# Patient Record
(demographics unavailable — no encounter records)

---

## 2024-11-29 NOTE — HISTORY OF PRESENT ILLNESS
[FreeTextEntry1] : 2024     in person  PCP: Ольга Read ([facility]) f: [phone]             Card: Dr. Garcia ([facility]) On Lipitor, beta blocker -> new cardiologist -              Gyn: Delmis              ENT: Dr. Rincon            .            CC: Hashimoto's thyroiditis/hypothyroid -  - Dr. Rahman             R lobectomy -  - Dr. Rincon - [facility]             -ThySono  - little residual tissue             Hyperkalemia (?related to propranolol)              Osteopenia: 2016 spine T -2.0 ([facility])             2018 spine T -2.4 hip +0.2 neck -0.7 rad -2.4              Tinnitus, R ear hearing loss             Hx cervical neck pain            . SEVERE shellfish allergy             Metabolic profile: , HDL 42, Trig 300s              preDM A1c 5.8 % on 17    76 yo recently seen at [facility] ED for episode of atrial fibrillation.  Did not respond to medical Rx and required cardioversion. I had already lowered the LT4 dose and she is reluctant to have it lowered further b/o energy and weight issues She was not able to orchestrate the labs prior to today's visit.          . Her , Oscar, has MS and recently also diagnosed with hematologic problem.:   CLL  Dr. Binh Giordano He is [place] [facility]. Her sister has Graves disease after vaccine for Covid.  Her mother recently passed age 103 after massive CVA.  3/2023 TSH 0.432  on Synthroid JOSE 100 mcg daily   Now takes LT4 88 mcg daily.  : : Constitutional:  Alert, well nourished, healthy appearance, no acute distress  Eyes:  No proptosis, no stare Thyroid:  normal to palpation   Pulmonary:  No respiratory distress, no accessory muscle use; normal rate and effort Cardiac:  Normal rate Vascular:  Endocrine:  No stigmata of Cushings Syndrome Musculoskeletal:  Normal gait, no involuntary movements Neurology:  No tremors Affect/Mood/Psych:  Oriented x 3; normal affect, normal insight/judgement, normal mood  .  Impression:  Time for updated TFTs, A1c  Call me one week to see if further adjustment of levothyroxine dose would be beneficial. Can add 5 mg doses of propranolol to the metoprolol if indicated.         2024     in person         developed attrial fibrillation     swithced from propranolol to metoprool and Elaquis                                                     has colonoscopy - coloized with MRSA and she gets infections    PCP: Ольга Read ([facility]) f: [phone]             Card: Dr. Garcia ([facility]) On Lipitor, beta blocker             Gyn: Delmis              ENT: Dr. Rincon            .            CC: Hashimoto's thyroiditis/hypothyroid -  - Dr. Lacey FLORES lobectomy -  - Dr. Rincon - [facility]             -ThySono 2016 - little residual tissue             Hyperkalemia (?related to propranolol)              Osteopenia: 2016 spine T -2.0 ([facility])             2018 spine T -2.4 hip +0.2 neck -0.7 rad -2.4              Tinnitus, R ear hearing loss             Hx cervical neck pain            . SEVERE shellfish allergy             Metabolic profile: , HDL 42, Trig 300s              preDM A1c 5.8 % on 17            . Her , Oscar, has MS and recently also diagnosed with hematologic problem.:   CLL  Dr. Binh Giordano He is [place] [facility]. Her sister has Graves disease after vaccine for Covid.  Her mother recently passed age 103 after massive CVA.  3/2023 TSH 0.432  on Synthroid JOSE 100 mcg daily    : : Constitutional:  Alert, well nourished, healthy appearance, no acute distress  Eyes:  No proptosis, no stare Thyroid: Pulmonary:  No respiratory distress, no accessory muscle use; normal rate and effort Cardiac:  Normal rate Vascular:  Endocrine:  No stigmata of Cushings Syndrome Musculoskeletal:  Normal gait, no involuntary movements Neurology:  No tremors Affect/Mood/Psych:  Oriented x 3; normal affect, normal insight/judgement, normal mood  . Impression:  In view of episode of atrial fibrillation, may benefit from slightly lower dose of levothyroxine. Plan.  Lower dose of levothyroxine to 88 mcg daily.   Will need to address osteoporosis after next bone density and when she has fewer other issues to address.      2023       in person     PCP: Ольга Read ([facility]) f: [phone]             Card: Dr. Garcia ([facility]) On Lipitor, beta blocker             Gyn: Delmis              ENT: Dr. Rincon            .            CC: Hashimoto's thyroiditis/hypothyroid -  - Dr. Lacey FLORES lobectomy -  - Dr. Rincon - [facility]             -2016 - little residual tissue             Hyperkalemia (?related to propranolol)              Osteopenia: 2016 spine T -2.0 ([facility])             2018 spine T -2.4 hip +0.2 neck -0.7 rad -2.4              Tinnitus, R ear hearing loss             Hx cervical neck pain            . SEVERE shellfish allergy             Metabolic profile: , HDL 42, Trig 300s              preDM A1c 5.8 % on 17            . Her , Oscar, has MS and recently also diagnosed with hematologic problem.:   CLL  Dr. Binh Giordano He is followed at [facility]. Her sister has Graves disease after vaccine for Covid.  Her mother recently passed age 103 after massive CVA.   very ill.  : : Constitutional:  Alert, well nourished, healthy appearance, no acute distress  Eyes:  No proptosis, no stare Thyroid: Pulmonary:  No respiratory distress, no accessory muscle use; normal rate and effort Cardiac:  Normal rate Vascular:  Endocrine:  No stigmata of Cushing's Syndrome Musculoskeletal:  Normal gait, no involuntary movements Neurology:  No tremors Affect/Mood/Psych:  Oriented x 3; normal affect, normal insight/judgement, normal mood  .  Impression:  While her medical issues are stable, she is under a lot of stress.  Plan:   She prefers labs at [facility] in 6-8n [facility]   Aug 26, 2022      in person         her  has been illl and her 's mother . after CVA     PCP: Ольга Read ([facility]) f: [phone]             Card: Dr. Garcia ( [facility]) On Lipitor, beta blocker             Gyn: Delmis              ENT: Dr. Rincon            .            CC: Hashimoto's thyroiditis/hypothyroid -  - Dr. Lacey FLORES lobectomy -  - Dr. Rincon - [facility]             -2016 - little residual tissue             Hyperkalemia (?related to propranolol)              Osteopenia: 2016 spine T -2.0 ([facility])             2018 spine T -2.4 hip +0.2 neck -0.7 rad -2.4              Tinnitus, R ear hearing loss             Hx cervical neck pain            . SEVERE shellfish allergy             Metabolic profile: , HDL 42, Trig 300s              preDM A1c 5.8 % on 17            . Her , Oscar, has MS and recently also diagnosed with hematologic problem.:   CLL  Dr. Binh Giordano He is followed at [facility] [place]. Her sister has Graves disease after vaccine for Covid.   : : Constitutional:  Alert, well nourished, healthy appearance, no acute distress  Eyes:  No proptosis, no stare Thyroid: Pulmonary:  No respiratory distress, no accessory muscle use; normal rate and effort Cardiac:  Normal rate Vascular:  Endocrine:  No stigmata of Cushing's Syndrome Musculoskeletal:  Normal gait, no involuntary movements Neurology:  No tremors Affect/Mood/Psych:  Oriented x 3; normal affect, normal insight/judgement, normal mood  .  Imp:  Osteoporosis of spine.  Tomorrow will have labs related to this.               Invited to >< ROV in six months.        Dec 21, 2021      in person    PCP: Ольга Read ([facility]) f: [phone]             Card: Dr. Garcia ([facility]) On Lipitor, beta blocker             Gyn: Delmis              ENT: Dr. Rincon            .            CC: Hashimoto's thyroiditis/hypothyroid -  - Dr. Rahman             R lobectomy -  - Dr. Rincon - [facility]             -ThySono  - little residual tissue             Hyperkalemia (?related to propranolol)              Osteopenia: 2016 spine T -2.0 ([facility])             2018 spine T -2.4 hip +0.2 neck -0.7 rad -2.4              22 spine T -2.8  ***             Tinnitus, R ear hearing loss             Hx cervical neck pain            . SEVERE shellfish allergy             Metabolic profile: , HDL 42, Trig 300s              preDM A1c 5.8 % on 17            . Her , Oscar, has MS and recently also diagnosed with hematologic problem.:   CLL  Dr. Binh Giordano He is followed at [facility] [place]. Her sister has Graves disease after vaccine for Covid. Patient's mother recent stroke.  Taking Synthorid 100 mcg daily.  Impression:  Hypothryoidism well controlled. Prediabetes well controlled. Lots of stress Almost time for followup bone density - this time at [place] [facility] within 6 months.  Plan:  6 1/2 tabs a week.         Oct 28, 2020       iPhone   [phone]  PCP: Ольга Read ([facility]) f: [phone]             Card: Dr. Garcia ([facility]) On Lipitor, beta blocker             Gyn: Delmis              ENT: Dr. Rincon            .            CC: Hashimoto's thyroiditis/hypothyroid -  - Dr. Rahman             R lobectomy -  - Dr. Rincon - [facility]             -ThySono  - little residual tissue             Hyperkalemia (?related to propranolol)              Osteopenia: 2016 spine T -2.0 ([facility])             2018 spine T -2.4 hip +0.2 neck -0.7 rad -2.4              Tinnitus, R ear hearing loss             Hx cervical neck pain            . SEVERE shellfish allergy             Metabolic profile: , HDL 42, Trig 300s              preDM A1c 5.8 % on 17            . Her  has MS and recently also diagnosed with hematologic problem. Her CBC is WNL and she will be seeing Dr. Yoon for breast exam.      1.   Prediabetes - strong FHx     will need meter for testing   A1c 5.7 and  2.   Osteopenia   needs x-ray and bone density at [facility].    3.  Hashimotos    100 daily  - ih good range.       ROV  3-4 months.    2020  This is a 21+ minute Tele-Phonic encounter with an established patient which was initiated by the patient during a time scheduled for a visit and the patient is aware that this may be a billable encounter.  The patient has not seen a provider of my specialty (Endocrinology) within out group in the past 7 days and this encounter is not anticipated to result in a scheduled in-person visit within he next 7 days. The reason for this Tele-Phonic encounter is listed below under "CC:" Verbal consent was discussed and obtained from the patient for this visit:  "You have chosen to receive care through the use of tele-media or telephone.   This enables health care providers at different locations to provide safe, effective, and convenient care through the use of technology.  Please note this is a billable encounter.  As with any health care service, there are risks associated with the use of tele-media or telephone, including equipment failure, poor image and/or resolution, and  issues.  You understand that I cannot physically examine you and that you may need to come to the office to complete the assessment.  Patient agreed verbally to understanding the risks, benefits, alternatives of Tele-Media and telephone as explained.  All questions regarding tele-media and telephone encounters were answered.  This is a 21+ minute Tele-Phonic encounter with an established patient which was initiated by the patient during a time scheduled for a visit and the patient is aware that this may be a billable encounter.  The patient has not seen a provider of my specialty (Endocrinology) within out group in the past 7 days and this encounter is not anticipated to result in a scheduled in-person visit within he next 7 days. The reason for this Tele-Phonic encounter is listed below under "CC:" Verbal consent was discussed and obtained from the patient for this visit:  "You have chosen to receive care through the use of tele-media or telephone.   This enables health care providers at different locations to provide safe, effective, and convenient care through the use of technology.  Please note this is a billable encounter.  As with any health care service, there are risks associated with the use of tele-media or telephone, including equipment failure, poor image and/or resolution, and  issues.  You understand that I cannot physically examine you and that you may need to come to the office to complete the assessment.  Patient agreed verbally to understanding the risks, benefits, alternatives of Tele-Media and telephone as explained.  All questions regarding tele-media and telephone encounters were answered.  PCP: Ольга Read ([facility]) f: [phone]             Card: Dr. Garcia ([facility]) On Lipitor, beta blocker             Gyn: Delmis              ENT: Dr. Rincon            .            CC: Hashimoto's thyroiditis/hypothyroid -  - Dr. Rahman             R lobectomy -  - Dr. Rincon - [facility]             [facility]  [facility]             Hyperkalemia (?related to propranolol)              Osteopenia: 2016 spine T -2.0 ([facility])             2018 spine T -2.4 hip +0.2 neck -0.7 rad -2.4              Tinnitus, R ear hearing loss             Hx cervical neck pain            . SEVERE shellfish allergy             Metabolic profile: , HDL 42, Trig 300s              preDM A1c 5.8 % on 17            .  has MS - working from home. Mother is 100 yo. Very cautiouss about going out. Did not get to go for labs but did go for bone density at [facility] and L4 T -2.6  Hip good bone  X-ray spine - no compressions  She drinks almond milk, takes 3000 iu D plus Centrum Silver. She took Fosamax for about 4 months - she didn't feel comfortable   Plan:  Consider calcitonin Rx in the future.     Oct 15, 2019  PCP: Ольга Read ([facility]) f: [phone]             Card: Dr. Garcia ([facility]) On Lipitor, beta blocker             Gyn: Delmis              ENT: Dr. Rincon            .            CC: Hashimoto's thyroiditis/hypothyroid -  - Dr. Lacey FLORES lobectomy -  - Dr. Rincon - [facility]             -ThySono  - little residual tissue             Hyperkalemia (?related to propranolol)              Osteopenia: 2016 spine T -2.0 ([facility])             2018 spine T -2.4 hip +0.2 neck -0.7 rad -2.4              Tinnitus, R ear hearing loss             Hx cervical neck pain            . SEVERE shellfish allergy             Metabolic profile: , HDL 42, Trig 300s              preDM A1c 5.8 % on 17            .  Had cellulitis L eyelid after bug bite. Remains on Synthroid 100 mcg daily x 7 daily  2016 at [facility]  X-ray C-spine (not spine)  2019 A1c 5.7 FBS 92 K 4.5 - still on propranolol an HDL 42 Tri 300 TSH 0.45 - on Synthroid 100  18 BD [facility] spine -2.4 TH + 0.2 FN -0.7 radius -2.4  Impression:  Osteopenia Plan:  Updated bone density by early 2020 and ROV after that         Plan:  X-ray spine and BD at [facility] in March at [place]   April 15, 2019  PCP: Ольга Read ([facility]) f: [phone]             Card: Dr. Garcia ([facility]) On Lipitor, beta blocker             Gyn: Delmis              ENT: Dr. Rincon            .            CC: Hashimoto's thyroiditis/hypothyroid -  - Dr. Lacey FLORES lobectomy -  - Dr. Rincon - [facility]             -Son2016 - little residual tissue             Hyperkalemia (?related to propranolol)              Osteopenia: 2016 spine T -2.0 ([facility])             2018 spine T -2.4 hip +0.2 neck -0.7 rad -2.4              Tinnitus, R ear hearing loss             Hx cervical neck pain            . SEVERE shellfish allergy             Metabolic profile: , HDL 42, Trig 300s              preDM A1c 5.8 % on 17            .  Takes JOSE Synthroid 100 mcg daily and 2000 iu [facility] labs good. Elevated triglyceride.    Plan:  Same Rx -jh            .    Previous notes from [employer] appended below.    2018            .            PCP: Ольга Read ([facility]) f: [phone]             Card: Dr. Garcai ([facility]) On Lipitor, beta blocker             Gyn: Delmis              ENT: Dr. Rincon            .            CC: Hashimoto's thyroiditis/hypothyroid -  - Dr. Lacey FLORES lobectomy -  - Dr. Rincon - [facility]             -2016 - little residual tissue             Hyperkalemia (?related to propranolol)              Osteopenia: 2016 spine T -2.0 ([facility])             2018 spine T -2.4 hip +0.2 neck -0.7 rad -2.4              Tinnitus, R ear hearing loss             Hx cervical neck pain            . SEVERE shellfish allergy             Metabolic profile: , HDL 42, Trig 300s              preDM A1c 5.8 % on 17            .            .            70 yo visits because of hypothyroidism, prediabetes,, osteopenia.            Takes levothyroxine 100 mcg x 7 tabs a week.            Takes Inderal LA 80 mg daily.            .            .,            Brought labs from            2018 Dr. Sharad Garcia            Trig 317 ***            LDL 75            HDL 38             glucose 102 ****            .            Has a half sister. Found out from [employer]            .            ==            .            2018            .            PCP: Ольга Read ([facility]) f: [phone]             Card: Dr. Garcia ([facility]) On Lipitor, beta blocker             Gyn: Delmis              ENT: Dr. Rincon            .            CC: Hashimoto's thyroiditis/hypothyroid -  - Dr. Lacey FLORES lobectomy -  - Dr. Rincon - [facility]             -2016 - little residual tissue             Hyperkalemia (?related to propranolol)              Osteopenia: 2016 spine T -2.0 ([facility])             2018 spine T -2.4 hip +0.2 neck -0.7 rad -2.4              Tinnitus, R ear hearing loss             Hx cervical neck pain            . SEVERE shellfish allergy             Metabolic profile: , HDL 42, Trig 300s              preDM A1c 5.8 % on 17            .            70 yo            Works in [employer] part time.             has MS            2018 [place] BD spine T -2.4, hip +0.2, neck -0.7, dradius -2.4            .            Lab 3/2018 [facility]            fBS 108            K 5.2            A1c 5.6            HDL 42            LDL 86            vit D 36            TSH 0.231 on 100 mcg daily of levothyroxine.            .            Plan: Lower levothyroxine to 6 1/2 tabs a week.            Updated labs in July ROV Sept            .            ==            .            2017            .            PCP: Ольга Read ([facility]) f: [phone]             Card: Dr. Garcia ([facility]) On Lipitor, beta blocker             Gyn: Delmis              ENT: Dr. Rincon            .            CC: Hashimoto's thyroiditis/hypothyroid -  - Dr. Rahman             R lobectomy -  - Dr. Rincon - [facility]             -ThySono  - little residual tissue             Hyperkalemia             Osteopenia: 2016 spine T -2.0              Tinnitus, R ear hearing loss             Today mentions cervical neck pain            . SEVERE shellfish allergy            .            # Hyperkalemia             ?Mild Type IV RTA             Labs pending            .            # HYpothyroidism d/t Hashimoto's thyroiditis. - Dx             .            # Thyroid nodule - R lobectomy - [facility] - Dr. Rincon -              u/s thyroid .            .            # Osteopenia             Plan: Next BD at [place].            .            ROV 6 months.             .            ==            .            February 3, 2017            .            PCP: Ольга Read ([facility]) f: [phone]             Card: Dr. Garcia ([facility]) On Lipitor, beta blocker             Gyn: Delmis              ENT: Dr. Rincon            .            CC: Hashimoto's thyroiditis/hypothyroid -  - Dr. Rahman             Hyperkalemia             Osteopenia: 2016 spine T -2.0              Tinnitus, R ear hearing loss             Today mentions cervical neck pain            . SEVERE shellfish allergy            .            # Elevated potassium             Brought labs             Renin and aldosterone - pending             Potassium 4.8 (stopped bananas)            .            # A1c             5.8 % on 17             1 hour after oatmeal,  mg/dl             .            Impression: Doing very well.            .            Plan: Continue surveillance            .            ==            .             .            .            ==            .            2016            .            PCP: Ольга Read ([facility]) f: [phone]             Card: Dr. Garcia ([facility]) On Lipitor, beta blocker             Gyn: Delmis              ENT: Dr. Rincon            .            CC: Hashimoto's thyroiditis/hypothyroid -  - Dr. Rahman             Hyperkalemia             Osteopenia: 2016 spine T -2.0              Tinnitus, R ear hearing loss             Today mentions cervical neck pain            .            # Recent thyroid sono: no tissue            .            # Bone density 2016 at [facility]             spine T -2.0 Z -0.1             .            Impression: Stable osteopenia and            stable hypothyroidism.             .            Plan: Continued Rx with levothyroxine and monitoring of TFTs.            .            ==            .            2016            .            PCP: Ольга Read ([facility]) f: [phone]             Card: Dr. Garcia ([facility]) On Lipitor, beta blocker             Gyn: Delmis              ENT: Dr. Rincon            .            CC: Hashimoto's thyroiditis/hypothyroid -  - Dr. Rahman             Hyperkalemia             Osteopenia: 2016 spine T -2.0              Tinnitus, R ear hearing loss             Today mentions cervical neck pain            .            Recent labs ([facility]) on             2016 show after meal appropriate elevation of C-peptide and insulin level.              mg/dl            potassium down to 4.8            .            Impression:            # Hashimoto's thyroiditis - stable - Thyroid sonogram  -              unremarkable.             Takes Synthroid 100 mcg daily.              Plan: Because of neck discomfort, updated ultrasound of              thyroid at [facility].            .            # Mild elevation of potassium ? Type IV RTA, ? beta blocker effect             Plan: Repeat with rein, aldosterone            .            # Cervical neck pain - f/u to Dr. Read afte X-ray cervical spine.             .            .            ==            .            2016            .            PCP: Ольга Read ([facility]) f: [phone]             Card: Dr. Garcia ([facility]) On Lipitor              Gyn: Delmis              ENT: Dr. Rincon            .            CC: Hashimoto's thyroiditis/hypothyroid -  - Dr. Rahman            .            # Hashimoto's thyroiditis            - - Dx by Dr. Rafi Rahman            - - Dr. Alston partial thyroidectomy            - - Thyroid sono - fine            .            # Ringing in ears -              Saw Dr. PORTER Girard -             .            # Elevated potassium            .             Went for f/u K at [facility] 2/3/2016 included                          creatinine 0.76             Na 142             potassium 5.3             cortisol 13.2             ACTH - 18            .            Med Pregl 2014 May "Severe hyperkalemia induced by propranolol. by [employer].....            .            Plan: She will discuss potassium and Inderal with Dr. Garcia.            ROV here in October.            .            .            .            ==            .            2016            .            PCP: Dr. Clifton Wang -> Ольга Read ([facility]) f: [phone]             Card: Dr. Garcia ([facility]) On Lipitor              Gyn: [facility]              ENT: Dr. Rincon            .            CC: Hashimoto's thyroiditis/hypothyroid -  - Dr. Rahman            .            # Hashimoto's thyroiditis            - - Dx by Dr. Rafi Rahman            - - Dr. Alston partial thyroidectomy            - - Thyroid sono - fine            .            Impression: Doing well.            .             after a big potato Potassium 5.5            Low HDL, elev triglycerides.            A1c 5.6 k'''            .            Impression: Hypothyroidism has been well controlled on JOSE Synthroid 100 mcg daily. Reason for elevation of potassium not clear.            .            Plan: To ENT for further evaluation of tinnitus and to inquire about possible relation to the potassium.            Repeat electrolytes with cortisol, aldosterone, ACTH to start.             Lipids addressed by Dr. Garcia.             .            ==            .            May 26, 2015            .            PCP: Dr. Clifton Wang -> Ольга Read ([facility]) f: [phone]             Card: Dr. Garcia ([facility])             Gyn: [facility]             .            CC: Hashimoto's thyroiditis/hypothyroid -  - Dr. Rahman            .            .            Has 1 yo and 3 week old grand kids. Moving to [place].             Colleague developed Quinn Nieves.            .            Currently taking JOSE Synthroid 100 mcg daily.            .            Thyroid hormone originally stared by Dr. Rafi Rahman in  as apparent suppression for R sided large inferior pole thyroid nodule. Patient also diagnosed with Hashimoto's thyroiditis (based on antibodies) at that time. In  underwent R thyroid lobectomy at [facility] by Dr. Rincon and nodule was benign.            .            Osteopenis treaterd with Fosamax starting in  for a period of time (particularly L3)            .            Impression: Still doing well on current dose per her recent labs            which also show lowish-normal HDL and elevated triglyceride.            .            Plan: She will f/u to Cardiology for advice on lipid profile in the near future and ROV in 8 months. F/U bone density after that.            .            ===            2014            PCP: Dr. Clifton Wang             Card: Dr. Garcia            CC: Hashimoto's thyroiditis            .            Currently takes JOSE Synthroid 100 mcg daily.            TFTs done by Dr. Wang: A[facility]            .            Impression: Doing very well.            Plan: ROV one year after TFTs            .            ===            2013            PCP: Dr. Wang Card: Dr. Issa Abad-> Dr. Garcia            CC: Hashimoto's thyroiditis            .            # PVCs, MVP - on Inderal LA for palpitations            ..            # Hashimoto's thyroiditis            - - Dx by Dr. Rafi Rahman            - - Dr. Alston partial thyroidectomy            - - Thyroid sono - fine            .            Today notes some eybrow thinning and less hair on legs.            Currently on JOSE Synthroid 100 mcg daily. She prefers this dose,            felt tired on 88.             Plan: Updated TFTs, BD and ROV one year.             .            .            ============            Visits with many stories. Last note appended below:            "Date: 2011 Re: Michael Ambriz 48            Attn: Dr. Wang cc: Dr. Issa Abad (cardiology [place])            Last note appended. Michael Ambriz returns regarding her history of Hashimoto's thyroiditis and R thyroid lobectomy for which she takes JOSE Synthroid 100 mcg daily. She took Fosamax for 18 months but did like it. She tried switching from Lipitor 20 mg daily to Crestor for elevated lipids, but is now back on Lipitor 20 mg daily. Her hysterectomy was 1 1/2 years ago. Dr. Issa Abad in [place] treats her MVP and palpitations with Inderal LA 80 mg daily.             Recent bone density showed spine T - 1.9 Z -0.8 L femur T -0.3 +0.8            Recent TSH 1.02 on JOSE Synthroid 100 mcg daily. Sonogram of the thyroid in  showed no suspicious areas in the remaining lobe.             She never smoked. No history of fractures. No history of long term sterooid use, but she has had two shots for R rotator cuff injury.             On exam, no palpable thyroid tissue.            Impression: Doing well on Synthroid 100 mcg daily.            Plan: Same Rx. Try to reduce Synthroid to 88 mcg daily in the future. ROV in 8 months. Thank you very much.             Sincerely,            James Hellerman MD            Endocrinology            [address] [place] [address]            c:  o:  f:             Date: 2011 Re: Michael Christieon 48            Attn: Dr. Clifton Wang            I saw Michael Ambriz who is now 62. Blood tests at [facility] in 2010 showed a normal TSH of 0.56 while taking L-thyroxine 100 mcg (JOSE Synthroid). Back in the 's Dr. Rafi Rahman found her to have very elevated anti-thyroid antibodies (Hashimoto's). A R thyroid lobectomy by Dr. Rincon () showed no suspicious pathology. Sonogram of the thyroid  showed nothing suspicious. Dr. Benites is her gynecologist and she had a hysterectomy last year (ovaries left in). She has taken Fosamax in the past for about six months. Last colonoscopy was in March (office of Dr. Gomes).             She is 5 ft 3 in tall and weighs 142 lbs. No palpable thyroid tissue.            Impression: Osteopenia in the past. Hashimoto's thyroiditis.            Plan: Continue JOSE Synthroid for now, but possible decrease dose in the future. Bone density at [facility]. ROV here in October. Thank you very much.            Sincerely,            James Hellerman MD".

## 2024-11-29 NOTE — HISTORY OF PRESENT ILLNESS
[FreeTextEntry1] : 2024     in person  PCP: Ольга Read ([facility]) f: [phone]             Card: Dr. Garcia ([facility]) On Lipitor, beta blocker -> new cardiologist -              Gyn: Delmis              ENT: Dr. Rincon            .            CC: Hashimoto's thyroiditis/hypothyroid -  - Dr. Rahman             R lobectomy -  - Dr. Rincon - [facility]             -ThySono  - little residual tissue             Hyperkalemia (?related to propranolol)              Osteopenia: 2016 spine T -2.0 ([facility])             2018 spine T -2.4 hip +0.2 neck -0.7 rad -2.4              Tinnitus, R ear hearing loss             Hx cervical neck pain            . SEVERE shellfish allergy             Metabolic profile: , HDL 42, Trig 300s              preDM A1c 5.8 % on 17    74 yo recently seen at [facility] ED for episode of atrial fibrillation.  Did not respond to medical Rx and required cardioversion. I had already lowered the LT4 dose and she is reluctant to have it lowered further b/o energy and weight issues She was not able to orchestrate the labs prior to today's visit.          . Her , Oscar, has MS and recently also diagnosed with hematologic problem.:   CLL  Dr. Binh Giordano He is followed at [facility] in [facility]. Her sister has Graves disease after vaccine for Covid.  Her mother recently passed age 103 after massive CVA.  3/2023 TSH 0.432  on Synthroid JOSE 100 mcg daily   Now takes LT4 88 mcg daily.  : : Constitutional:  Alert, well nourished, healthy appearance, no acute distress  Eyes:  No proptosis, no stare Thyroid:  normal to palpation   Pulmonary:  No respiratory distress, no accessory muscle use; normal rate and effort Cardiac:  Normal rate Vascular:  Endocrine:  No stigmata of Cushings Syndrome Musculoskeletal:  Normal gait, no involuntary movements Neurology:  No tremors Affect/Mood/Psych:  Oriented x 3; normal affect, normal insight/judgement, normal mood  .  Impression:  Time for updated TFTs, A1c  Call me one week to see if further adjustment of levothyroxine dose would be beneficial. Can add 5 mg doses of propranolol to the metoprolol if indicated.         2024     in person         developed attrial fibrillation     swithced from propranolol to metoprool and Elaquis                                                     has colonoscopy - coloized with MRSA and she gets infections    PCP: Ольга Read ([facility]) f: [phone]             Card: Dr. Garcia ([facility]) On Lipitor, beta blocker             Gyn: Delmis              ENT: Dr. Rincon            .            CC: Hashimoto's thyroiditis/hypothyroid -  - Dr. Lacey FLORES lobectomy -  - Dr. Rincon - [facility]             -ThySono 2016 - little residual tissue             Hyperkalemia (?related to propranolol)              Osteopenia: 2016 spine T -2.0 ([facility])             2018 spine T -2.4 hip +0.2 neck -0.7 rad -2.4              Tinnitus, R ear hearing loss             Hx cervical neck pain            . SEVERE shellfish allergy             Metabolic profile: , HDL 42, Trig 300s              preDM A1c 5.8 % on 17            . Her , Oscar, has MS and recently also diagnosed with hematologic problem.:   CLL  Dr. Binh Giordano He is followed at [facility] in [facility]. Her sister has Graves disease after vaccine for Covid.  Her mother recently passed age 103 after massive CVA.  3/2023 TSH 0.432  on Synthroid JOSE 100 mcg daily    : : Constitutional:  Alert, well nourished, healthy appearance, no acute distress  Eyes:  No proptosis, no stare Thyroid: Pulmonary:  No respiratory distress, no accessory muscle use; normal rate and effort Cardiac:  Normal rate Vascular:  Endocrine:  No stigmata of Cushings Syndrome Musculoskeletal:  Normal gait, no involuntary movements Neurology:  No tremors Affect/Mood/Psych:  Oriented x 3; normal affect, normal insight/judgement, normal mood  . Impression:  In view of episode of atrial fibrillation, may benefit from slightly lower dose of levothyroxine. Plan.  Lower dose of levothyroxine to 88 mcg daily.   Will need to address osteoporosis after next bone density and when she has fewer other issues to address.      2023       in person     PCP: Ольга Read ([facility]) f: [phone]             Card: Dr. Garcia ([facility]) On Lipitor, beta blocker             Gyn: Delmis              ENT: Dr. Rincon            .            CC: Hashimoto's thyroiditis/hypothyroid -  - Dr. Lacey FLORES lobectomy -  - Dr. Rincon - [facility]             -2016 - little residual tissue             Hyperkalemia (?related to propranolol)              Osteopenia: 2016 spine T -2.0 ([facility])             2018 spine T -2.4 hip +0.2 neck -0.7 rad -2.4              Tinnitus, R ear hearing loss             Hx cervical neck pain            . SEVERE shellfish allergy             Metabolic profile: , HDL 42, Trig 300s              preDM A1c 5.8 % on 17            . Her , Oscar, has MS and recently also diagnosed with hematologic problem.:   CLL  Dr. Binh Giordano He is followed at [facility] in [facility]. Her sister has Graves disease after vaccine for Covid.  Her mother recently passed age 103 after massive CVA.   very ill.  : : Constitutional:  Alert, well nourished, healthy appearance, no acute distress  Eyes:  No proptosis, no stare Thyroid: Pulmonary:  No respiratory distress, no accessory muscle use; normal rate and effort Cardiac:  Normal rate Vascular:  Endocrine:  No stigmata of Cushing's Syndrome Musculoskeletal:  Normal gait, no involuntary movements Neurology:  No tremors Affect/Mood/Psych:  Oriented x 3; normal affect, normal insight/judgement, normal mood  .  Impression:  While her medical issues are stable, she is under a lot of stress.  Plan:   She prefers labs at [facility] in [facility]   Aug 26, 2022      in person         her  has been illl and her 's mother . after CVA     PCP: Ольга Read ([facility]) f: [phone]             Card: Dr. Garcia ( [facility]) On Lipitor, beta blocker             Gyn: Delmis              ENT: Dr. Rincon            .            CC: Hashimoto's thyroiditis/hypothyroid -  - Dr. Lacey FLORES lobectomy -  - Dr. Rincon - [facility]             -2016 - little residual tissue             Hyperkalemia (?related to propranolol)              Osteopenia: 2016 spine T -2.0 ([facility])             2018 spine T -2.4 hip +0.2 neck -0.7 rad -2.4              Tinnitus, R ear hearing loss             Hx cervical neck pain            . SEVERE shellfish allergy             Metabolic profile: , HDL 42, Trig 300s              preDM A1c 5.8 % on 17            . Her , Oscar, has MS and recently also diagnosed with hematologic problem.:   CLL  Dr. Binh Giordano He is followed at [facility] in [place]. Her sister has Graves disease after vaccine for Covid.   : : Constitutional:  Alert, well nourished, healthy appearance, no acute distress  Eyes:  No proptosis, no stare Thyroid: Pulmonary:  No respiratory distress, no accessory muscle use; normal rate and effort Cardiac:  Normal rate Vascular:  Endocrine:  No stigmata of Cushing's Syndrome Musculoskeletal:  Normal gait, no involuntary movements Neurology:  No tremors Affect/Mood/Psych:  Oriented x 3; normal affect, normal insight/judgement, normal mood  .  Imp:  Osteoporosis of spine.  Tomorrow will have labs related to this.               Invited to >< ROV in six months.        Dec 21, 2021      in person    PCP: Ольга Read ([facility]) f: [phone]             Card: Dr. Garcia ([facility]) On Lipitor, beta blocker             Gyn: Delmis              ENT: Dr. Rincon            .            CC: Hashimoto's thyroiditis/hypothyroid -  - Dr. Rahman             R lobectomy -  - Dr. Rincon - [facility]             -ThySono  - little residual tissue             Hyperkalemia (?related to propranolol)              Osteopenia: 2016 spine T -2.0 ([facility])             2018 spine T -2.4 hip +0.2 neck -0.7 rad -2.4              22 spine T -2.8  ***             Tinnitus, R ear hearing loss             Hx cervical neck pain            . SEVERE shellfish allergy             Metabolic profile: , HDL 42, Trig 300s              preDM A1c 5.8 % on 17            . Her , Oscar, has MS and recently also diagnosed with hematologic problem.:   CLL  Dr. Binh Giordano He is followed at [facility] in [place]. Her sister has Graves disease after vaccine for Covid. Patient's mother recent stroke.  Taking Synthorid 100 mcg daily.  Impression:  Hypothryoidism well controlled. Prediabetes well controlled. Lots of stress Almost time for followup bone density - this time at [place] [facility] within 6 months.  Plan:  6 1/2 tabs a week.         Oct 28, 2020       iPhone   [phone]  PCP: Ольга Read ([facility]) f: [phone]             Card: Dr. Garcia ([facility]) On Lipitor, beta blocker             Gyn: Delmis              ENT: Dr. Rincon            .            CC: Hashimoto's thyroiditis/hypothyroid -  - Dr. Rahman             R lobectomy -  - Dr. Rincon - [facility]             -ThySono  - little residual tissue             Hyperkalemia (?related to propranolol)              Osteopenia: 2016 spine T -2.0 ([facility])             2018 spine T -2.4 hip +0.2 neck -0.7 rad -2.4              Tinnitus, R ear hearing loss             Hx cervical neck pain            . SEVERE shellfish allergy             Metabolic profile: , HDL 42, Trig 300s              preDM A1c 5.8 % on 17            . Her  has MS and recently also diagnosed with hematologic problem. Her CBC is WNL and she will be seeing Dr. Yoon for breast exam.      1.   Prediabetes - strong FHx     will need meter for testing   A1c 5.7 and  2.   Osteopenia   needs x-ray and bone density at [facility].    3.  Hashimotos    100 daily  - ih good range.       ROV  3-4 months.    2020  This is a 21+ minute Tele-Phonic encounter with an established patient which was initiated by the patient during a time scheduled for a visit and the patient is aware that this may be a billable encounter.  The patient has not seen a provider of my specialty (Endocrinology) within out group in the past 7 days and this encounter is not anticipated to result in a scheduled in-person visit within he next 7 days. The reason for this Tele-Phonic encounter is listed below under "CC:" Verbal consent was discussed and obtained from the patient for this visit:  "You have chosen to receive care through the use of tele-media or telephone.   This enables health care providers at different locations to provide safe, effective, and convenient care through the use of technology.  Please note this is a billable encounter.  As with any health care service, there are risks associated with the use of tele-media or telephone, including equipment failure, poor image and/or resolution, and  issues.  You understand that I cannot physically examine you and that you may need to come to the office to complete the assessment.  Patient agreed verbally to understanding the risks, benefits, alternatives of Tele-Media and telephone as explained.  All questions regarding tele-media and telephone encounters were answered.  This is a 21+ minute Tele-Phonic encounter with an established patient which was initiated by the patient during a time scheduled for a visit and the patient is aware that this may be a billable encounter.  The patient has not seen a provider of my specialty (Endocrinology) within out group in the past 7 days and this encounter is not anticipated to result in a scheduled in-person visit within he next 7 days. The reason for this Tele-Phonic encounter is listed below under "CC:" Verbal consent was discussed and obtained from the patient for this visit:  "You have chosen to receive care through the use of tele-media or telephone.   This enables health care providers at different locations to provide safe, effective, and convenient care through the use of technology.  Please note this is a billable encounter.  As with any health care service, there are risks associated with the use of tele-media or telephone, including equipment failure, poor image and/or resolution, and  issues.  You understand that I cannot physically examine you and that you may need to come to the office to complete the assessment.  Patient agreed verbally to understanding the risks, benefits, alternatives of Tele-Media and telephone as explained.  All questions regarding tele-media and telephone encounters were answered.  PCP: Ольга Read ([facility]) f: [phone]             Card: Dr. Garcia ([facility]) On Lipitor, beta blocker             Gyn: Delmis              ENT: Dr. Rincon            .            CC: Hashimoto's thyroiditis/hypothyroid -  - Dr. Rahman             R lobectomy -  - Dr. Rincon - [facility]             [facility]  - little residual tissue             Hyperkalemia (?related to propranolol)              Osteopenia: 2016 spine T -2.0 ([facility])             2018 spine T -2.4 hip +0.2 neck -0.7 rad -2.4              Tinnitus, R ear hearing loss             Hx cervical neck pain            . SEVERE shellfish allergy             Metabolic profile: , HDL 42, Trig 300s              preDM A1c 5.8 % on 17            .  has MS - working from home. Mother is 100 yo. Very cautiouss about going out. Did not get to go for labs but did go for bone density at [facility] and L4 T -2.6  Hip good bone  X-ray spine - no compressions  She drinks almond milk, takes 3000 iu D plus Centrum Silver. She took Fosamax for about 4 months - she didn't feel comfortable   Plan:  Consider calcitonin Rx in the future.     Oct 15, 2019  PCP: Ольга Read ([facility]) f: [phone]             Card: Dr. Garcia ([facility]) On Lipitor, beta blocker             Gyn: Delmis              ENT: Dr. Rincon            .            CC: Hashimoto's thyroiditis/hypothyroid -  - Dr. Lacey FLORES lobectomy -  - Dr. Rincon - [facility]             -ThySono  - little residual tissue             Hyperkalemia (?related to propranolol)              Osteopenia: 2016 spine T -2.0 ([facility])             2018 spine T -2.4 hip +0.2 neck -0.7 rad -2.4              Tinnitus, R ear hearing loss             Hx cervical neck pain            . SEVERE shellfish allergy             Metabolic profile: , HDL 42, Trig 300s              preDM A1c 5.8 % on 17            .  Had cellulitis L eyelid after bug bite. Remains on Synthroid 100 mcg daily x 7 daily  2016 at [facility]  X-ray C-spine (not spine)  2019 A1c 5.7 FBS 92 K 4.5 - still on propranolol an HDL 42 Tri 300 TSH 0.45 - on Synthroid 100  18 BD [facility] spine -2.4 TH + 0.2 FN -0.7 radius -2.4  Impression:  Osteopenia Plan:  Updated bone density by early 2020 and ROV after that         Plan:  X-ray spine and BD at [facility] in March at [place]   April 15, 2019  PCP: Ольга Read ([facility]) f: [phone]             Card: Dr. Garcia ([facility]) On Lipitor, beta blocker             Gyn: Delmis              ENT: Dr. Rincon            .            CC: Hashimoto's thyroiditis/hypothyroid -  - Dr. Lacey FLORES lobectomy -  - Dr. Rincon - [facility]             -Son2016 - little residual tissue             Hyperkalemia (?related to propranolol)              Osteopenia: 2016 spine T -2.0 ([facility])             2018 spine T -2.4 hip +0.2 neck -0.7 rad -2.4              Tinnitus, R ear hearing loss             Hx cervical neck pain            . SEVERE shellfish allergy             Metabolic profile: , HDL 42, Trig 300s              preDM A1c 5.8 % on 17            .  Takes JOSE Synthroid 100 mcg daily and 2000 iu [facility] labs good. Elevated triglyceride.    Plan:  Same Rx -jh            .    Previous notes from [employer] appended below.    2018            .            PCP: Ольга Read ([facility]) f: [phone]             Card: Dr. Garcia ([facility]) On Lipitor, beta blocker             Gyn: Delmis              ENT: Dr. Rincon            .            CC: Hashimoto's thyroiditis/hypothyroid -  - Dr. Lacey FLORES lobectomy -  - Dr. Rincon - [facility]             -2016 - little residual tissue             Hyperkalemia (?related to propranolol)              Osteopenia: 2016 spine T -2.0 ([facility])             2018 spine T -2.4 hip +0.2 neck -0.7 rad -2.4              Tinnitus, R ear hearing loss             Hx cervical neck pain            . SEVERE shellfish allergy             Metabolic profile: , HDL 42, Trig 300s              preDM A1c 5.8 % on 17            .            .            70 yo visits because of hypothyroidism, prediabetes,, osteopenia.            Takes levothyroxine 100 mcg x 7 tabs a week.            Takes Inderal LA 80 mg daily.            .            .,            Brought labs from            2018 Dr. Sharad Garcia            Trig 317 ***            LDL 75            HDL 38             glucose 102 ****            .            Has a half sister. Found out from [employer]            .            ==            .            2018            .            PCP: Ольга Read ([facility]) f: [phone]             Card: Dr. Garcia ([facility]) On Lipitor, beta blocker             Gyn: Delmis              ENT: Dr. Rincon            .            CC: Hashimoto's thyroiditis/hypothyroid -  - Dr. Lacey FLORES lobectomy -  - Dr. Rincon - [facility]             -2016 - little residual tissue             Hyperkalemia (?related to propranolol)              Osteopenia: 2016 spine T -2.0 ([facility])             2018 spine T -2.4 hip +0.2 neck -0.7 rad -2.4              Tinnitus, R ear hearing loss             Hx cervical neck pain            . SEVERE shellfish allergy             Metabolic profile: , HDL 42, Trig 300s              preDM A1c 5.8 % on 17            .            70 yo            Works in [employer] part time.             has MS            2018 [place] [facility] BD spine T -2.4, hip +0.2, neck -0.7, dradius -2.4            .            Lab 3/2018 [facility]            fBS 108            K 5.2            A1c 5.6            HDL 42            LDL 86            vit D 36            TSH 0.231 on 100 mcg daily of levothyroxine.            .            Plan: Lower levothyroxine to 6 1/2 tabs a week.            Updated labs in July ROV Sept            .            ==            .            2017            .            PCP: Ольга Read ([facility]) f: [phone]             Card: Dr. Garcia ([facility]) On Lipitor, beta blocker             Gyn: Delmis              ENT: Dr. Rincon            .            CC: Hashimoto's thyroiditis/hypothyroid -  - Dr. Rahman             R lobectomy -  - Dr. Rincon - [facility]             -ThySono  - little residual tissue             Hyperkalemia             Osteopenia: 2016 spine T -2.0              Tinnitus, R ear hearing loss             Today mentions cervical neck pain            . SEVERE shellfish allergy            .            # Hyperkalemia             ?Mild Type IV RTA             Labs pending            .            # HYpothyroidism d/t Hashimoto's thyroiditis. - Dx             .            # Thyroid nodule - R lobectomy - [facility] - Dr. Rincon -              u/s thyroid .            .            # Osteopenia             Plan: Next BD at [place].            .            ROV 6 months.             .            ==            .            February 3, 2017            .            PCP: Ольга Read ([facility]) f: [phone]             Card: Dr. Garcia ([facility]) On Lipitor, beta blocker             Gyn: Delmis              ENT: Dr. Rincon            .            CC: Hashimoto's thyroiditis/hypothyroid -  - Dr. Rahman             Hyperkalemia             Osteopenia: 2016 spine T -2.0              Tinnitus, R ear hearing loss             Today mentions cervical neck pain            . SEVERE shellfish allergy            .            # Elevated potassium             Brought labs             Renin and aldosterone - pending             Potassium 4.8 (stopped bananas)            .            # A1c             5.8 % on 17             1 hour after oatmeal,  mg/dl             .            Impression: Doing very well.            .            Plan: Continue surveillance            .            ==            .             .            .            ==            .            2016            .            PCP: Ольга Read ([facility]) f: [phone]             Card: Dr. Garcia ([facility]) On Lipitor, beta blocker             Gyn: Delmis              ENT: Dr. Rincon            .            CC: Hashimoto's thyroiditis/hypothyroid -  - Dr. Rahman             Hyperkalemia             Osteopenia: 2016 spine T -2.0              Tinnitus, R ear hearing loss             Today mentions cervical neck pain            .            # Recent thyroid sono: no tissue            .            # Bone density 2016 at [facility]             spine T -2.0 Z -0.1             .            Impression: Stable osteopenia and            stable hypothyroidism.             .            Plan: Continued Rx with levothyroxine and monitoring of TFTs.            .            ==            .            2016            .            PCP: Ольга Read ([facility]) f: [phone]             Card: Dr. Garcia ([facility]) On Lipitor, beta blocker             Gyn: Delmis              ENT: Dr. Rincon            .            CC: Hashimoto's thyroiditis/hypothyroid -  - Dr. Rahman             Hyperkalemia             Osteopenia: 2016 spine T -2.0              Tinnitus, R ear hearing loss             Today mentions cervical neck pain            .            Recent labs ([facility]) on             2016 show after meal appropriate elevation of C-peptide and insulin level.              mg/dl            potassium down to 4.8            .            Impression:            # Hashimoto's thyroiditis - stable - Thyroid sonogram  -              unremarkable.             Takes Synthroid 100 mcg daily.              Plan: Because of neck discomfort, updated ultrasound of              thyroid at [facility].            .            # Mild elevation of potassium ? Type IV RTA, ? beta blocker effect             Plan: Repeat with rein, aldosterone            .            # Cervical neck pain - f/u to Dr. Read afte X-ray cervical spine.             .            .            ==            .            2016            .            PCP: Ольга Read ([facility]) f: [phone]             Card: Dr. Garcia ([facility]) On Lipitor              Gyn: Delmis              ENT: Dr. Rincon            .            CC: Hashimoto's thyroiditis/hypothyroid -  - Dr. Rahman            .            # Hashimoto's thyroiditis            - - Dx by Dr. Rafi Rahman            - - Dr. Alston partial thyroidectomy            - - Thyroid sono - fine            .            # Ringing in ears -              Saw Dr. PORTER Girard -             .            # Elevated potassium            .             Went for f/u K at [facility] 2/3/2016 included                          creatinine 0.76             Na 142             potassium 5.3             cortisol 13.2             ACTH - 18            .            Med Pregl 2014 May "Severe hyperkalemia induced by propranolol. by [employer].....            .            Plan: She will discuss potassium and Inderal with Dr. Garcia.            ROV here in October.            .            .            .            ==            .            2016            .            PCP: Dr. Clifton Wang -> Ольга Read ([facility]) f: [phone]             Card: Dr. Garcia ([facility]) On Lipitor              Gyn: Delmis              ENT: Dr. Rincon            .            CC: Hashimoto's thyroiditis/hypothyroid -  - Dr. Rahman            .            # Hashimoto's thyroiditis            - - Dx by Dr. Rafi Rahman            - - Dr. Alston partial thyroidectomy            - - Thyroid sono - fine            .            Impression: Doing well.            .             after a big potato Potassium 5.5            Low HDL, elev triglycerides.            A1c 5.6 k'''            .            Impression: Hypothyroidism has been well controlled on JOSE Synthroid 100 mcg daily. Reason for elevation of potassium not clear.            .            Plan: To ENT for further evaluation of tinnitus and to inquire about possible relation to the potassium.            Repeat electrolytes with cortisol, aldosterone, ACTH to start.             Lipids addressed by Dr. Garcia.             .            ==            .            May 26, 2015            .            PCP: Dr. Clifton Wang -> Ольга Read ([facility]) f: [phone]             Card: Dr. Garcia ([facility])             Gyn: Delmis             .            CC: Hashimoto's thyroiditis/hypothyroid -  - Dr. Rahman            .            .            Has 3 yo and 3 week old grand kids. Moving to [place].             Colleague developed Quinn Nieves.            .            Currently taking JOSE Synthroid 100 mcg daily.            .            Thyroid hormone originally stared by Dr. Rafi Rahman in  as apparent suppression for R sided large inferior pole thyroid nodule. Patient also diagnosed with Hashimoto's thyroiditis (based on antibodies) at that time. In  underwent R thyroid lobectomy at [facility] by Dr. Rincon and nodule was benign.            .            Osteopenis treaterd with Fosamax starting in  for a period of time (particularly L3)            .            Impression: Still doing well on current dose per her recent labs            which also show lowish-normal HDL and elevated triglyceride.            .            Plan: She will f/u to Cardiology for advice on lipid profile in the near future and ROV in 8 months. F/U bone density after that.            .            ===            2014            PCP: Dr. Clifton Wang             Card: Dr. Garcia            CC: Hashimoto's thyroiditis            .            Currently takes JOSE Synthroid 100 mcg daily.            TFTs done by Dr. Wang: A-OK            .            Impression: Doing very well.            Plan: ROV one year after TFTs            .            ===            2013            PCP: Dr. Wang Card: Dr. Issa Abad-> Dr. Garcia            CC: Hashimoto's thyroiditis            .            # PVCs, MVP - on Inderal LA for palpitations            ..            # Hashimoto's thyroiditis            - - Dx by Dr. Rafi Rahman            - - Dr. Alston partial thyroidectomy            - - Thyroid sono - fine            .            Today notes some eybrow thinning and less hair on legs.            Currently on JOSE Synthroid 100 mcg daily. She prefers this dose,            felt tired on 88.             Plan: Updated TFTs, BD and ROV one year.             .            .            ============            Visits with many stories. Last note appended below:            "Date: 2011 Re: Michael Ambriz 48            Attn: Dr. Wang cc: Dr. Issa Abad (cardiology [place])            Last note appended. Michael Ambriz returns regarding her history of Hashimoto's thyroiditis and R thyroid lobectomy for which she takes JOSE Synthroid 100 mcg daily. She took Fosamax for 18 months but did like it. She tried switching from Lipitor 20 mg daily to Crestor for elevated lipids, but is now back on Lipitor 20 mg daily. Her hysterectomy was 1 1/2 years ago. Dr. Issa Abad in [place] treats her MVP and palpitations with Inderal LA 80 mg daily.             Recent bone density showed spine T - 1.9 Z -0.8 L femur T -0.3 +0.8            Recent TSH 1.02 on JOSE Synthroid 100 mcg daily. Sonogram of the thyroid in  showed no suspicious areas in the remaining lobe.             She never smoked. No history of fractures. No history of long term sterooid use, but she has had two shots for R rotator cuff injury.             On exam, no palpable thyroid tissue.            Impression: Doing well on Synthroid 100 mcg daily.            Plan: Same Rx. Try to reduce Synthroid to 88 mcg daily in the future. ROV in 8 months. Thank you very much.             Sincerely,            James Hellerman MD            Endocrinology            [address] [place] [address]            c:  o:  f:             Date: 2011 Re: Michael Christieon 48            Attn: Dr. Clifton Wang            I saw Michael Ambriz who is now 62. Blood tests at [facility] in 2010 showed a normal TSH of 0.56 while taking L-thyroxine 100 mcg (JOSE Synthroid). Back in the 's Dr. Rafi Rahman found her to have very elevated anti-thyroid antibodies (Hashimoto's). A R thyroid lobectomy by Dr. Rincon () showed no suspicious pathology. Sonogram of the thyroid  showed nothing suspicious. Dr. Benites is her gynecologist and she had a hysterectomy last year (ovaries left in). She has taken Fosamax in the past for about six months. Last colonoscopy was in March (office of Dr. Gomes).             She is 5 ft 3 in tall and weighs 142 lbs. No palpable thyroid tissue.            Impression: Osteopenia in the past. Hashimoto's thyroiditis.            Plan: Continue JOSE Synthroid for now, but possible decrease dose in the future. Bone density at [facility]. ROV here in October. Thank you very much.            Sincerely,            James Hellerman MD".